# Patient Record
Sex: FEMALE | Race: WHITE | NOT HISPANIC OR LATINO | Employment: PART TIME | URBAN - METROPOLITAN AREA
[De-identification: names, ages, dates, MRNs, and addresses within clinical notes are randomized per-mention and may not be internally consistent; named-entity substitution may affect disease eponyms.]

---
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
## 2022-06-17 ENCOUNTER — APPOINTMENT (OUTPATIENT)
Dept: GENERAL RADIOLOGY | Facility: CLINIC | Age: 22
End: 2022-06-17
Attending: EMERGENCY MEDICINE
Payer: COMMERCIAL

## 2022-06-17 ENCOUNTER — APPOINTMENT (OUTPATIENT)
Dept: CT IMAGING | Facility: CLINIC | Age: 22
End: 2022-06-17
Attending: EMERGENCY MEDICINE
Payer: COMMERCIAL

## 2022-06-17 ENCOUNTER — HOSPITAL ENCOUNTER (EMERGENCY)
Facility: CLINIC | Age: 22
Discharge: HOME | End: 2022-06-17
Attending: EMERGENCY MEDICINE | Admitting: EMERGENCY MEDICINE
Payer: COMMERCIAL

## 2022-06-17 VITALS
OXYGEN SATURATION: 99 % | HEART RATE: 50 BPM | RESPIRATION RATE: 9 BRPM | SYSTOLIC BLOOD PRESSURE: 115 MMHG | DIASTOLIC BLOOD PRESSURE: 67 MMHG | WEIGHT: 160.8 LBS | TEMPERATURE: 97.8 F

## 2022-06-17 DIAGNOSIS — R25.1 SHAKING: ICD-10-CM

## 2022-06-17 DIAGNOSIS — R06.00 DYSPNEA, UNSPECIFIED TYPE: ICD-10-CM

## 2022-06-17 DIAGNOSIS — R53.1 GENERALIZED WEAKNESS: ICD-10-CM

## 2022-06-17 DIAGNOSIS — R11.0 NAUSEA: ICD-10-CM

## 2022-06-17 LAB
ALBUMIN SERPL-MCNC: 3.8 G/DL (ref 3.4–5)
ALBUMIN UR-MCNC: NEGATIVE MG/DL
ALP SERPL-CCNC: 67 U/L (ref 40–150)
ALT SERPL W P-5'-P-CCNC: 31 U/L (ref 0–50)
ANION GAP SERPL CALCULATED.3IONS-SCNC: 12 MMOL/L (ref 3–14)
APPEARANCE UR: CLEAR
AST SERPL W P-5'-P-CCNC: 19 U/L (ref 0–45)
BASOPHILS # BLD AUTO: 0 10E3/UL (ref 0–0.2)
BASOPHILS NFR BLD AUTO: 0 %
BILIRUB SERPL-MCNC: 1 MG/DL (ref 0.2–1.3)
BILIRUB UR QL STRIP: NEGATIVE
BUN SERPL-MCNC: 14 MG/DL (ref 7–30)
CALCIUM SERPL-MCNC: 9 MG/DL (ref 8.5–10.1)
CHLORIDE BLD-SCNC: 108 MMOL/L (ref 94–109)
CK SERPL-CCNC: 439 U/L (ref 30–225)
CO2 SERPL-SCNC: 18 MMOL/L (ref 20–32)
COLOR UR AUTO: ABNORMAL
CREAT SERPL-MCNC: 0.78 MG/DL (ref 0.52–1.04)
D DIMER PPP FEU-MCNC: 0.66 UG/ML FEU (ref 0–0.5)
EOSINOPHIL # BLD AUTO: 0 10E3/UL (ref 0–0.7)
EOSINOPHIL NFR BLD AUTO: 0 %
ERYTHROCYTE [DISTWIDTH] IN BLOOD BY AUTOMATED COUNT: 12.1 % (ref 10–15)
FLUAV RNA SPEC QL NAA+PROBE: NEGATIVE
FLUBV RNA RESP QL NAA+PROBE: NEGATIVE
GFR SERPL CREATININE-BSD FRML MDRD: >90 ML/MIN/1.73M2
GLUCOSE BLD-MCNC: 112 MG/DL (ref 70–99)
GLUCOSE BLDC GLUCOMTR-MCNC: 119 MG/DL (ref 70–99)
GLUCOSE UR STRIP-MCNC: NEGATIVE MG/DL
HCG SER QL IA.RAPID: NEGATIVE
HCT VFR BLD AUTO: 41.4 % (ref 35–47)
HGB BLD-MCNC: 14.5 G/DL (ref 11.7–15.7)
HGB UR QL STRIP: NEGATIVE
IMM GRANULOCYTES # BLD: 0.1 10E3/UL
IMM GRANULOCYTES NFR BLD: 0 %
KETONES UR STRIP-MCNC: NEGATIVE MG/DL
LEUKOCYTE ESTERASE UR QL STRIP: ABNORMAL
LYMPHOCYTES # BLD AUTO: 0.9 10E3/UL (ref 0.8–5.3)
LYMPHOCYTES NFR BLD AUTO: 7 %
MAGNESIUM SERPL-MCNC: 1.7 MG/DL (ref 1.6–2.3)
MCH RBC QN AUTO: 30 PG (ref 26.5–33)
MCHC RBC AUTO-ENTMCNC: 35 G/DL (ref 31.5–36.5)
MCV RBC AUTO: 86 FL (ref 78–100)
MONOCYTES # BLD AUTO: 0.5 10E3/UL (ref 0–1.3)
MONOCYTES NFR BLD AUTO: 4 %
NEUTROPHILS # BLD AUTO: 10.7 10E3/UL (ref 1.6–8.3)
NEUTROPHILS NFR BLD AUTO: 89 %
NITRATE UR QL: NEGATIVE
NRBC # BLD AUTO: 0 10E3/UL
NRBC BLD AUTO-RTO: 0 /100
NT-PROBNP SERPL-MCNC: 34 PG/ML (ref 0–450)
PH UR STRIP: 7.5 [PH] (ref 5–7)
PLATELET # BLD AUTO: 235 10E3/UL (ref 150–450)
POTASSIUM BLD-SCNC: 3.6 MMOL/L (ref 3.4–5.3)
PROT SERPL-MCNC: 7.4 G/DL (ref 6.8–8.8)
RBC # BLD AUTO: 4.83 10E6/UL (ref 3.8–5.2)
RBC URINE: 1 /HPF
RSV RNA SPEC NAA+PROBE: NEGATIVE
SARS-COV-2 RNA RESP QL NAA+PROBE: NEGATIVE
SODIUM SERPL-SCNC: 138 MMOL/L (ref 133–144)
SP GR UR STRIP: 1 (ref 1–1.03)
SQUAMOUS EPITHELIAL: 1 /HPF
TROPONIN I SERPL HS-MCNC: <3 NG/L
TSH SERPL DL<=0.005 MIU/L-ACNC: 0.96 MU/L (ref 0.4–4)
UROBILINOGEN UR STRIP-MCNC: NORMAL MG/DL
WBC # BLD AUTO: 12.1 10E3/UL (ref 4–11)
WBC URINE: 6 /HPF

## 2022-06-17 PROCEDURE — 250N000013 HC RX MED GY IP 250 OP 250 PS 637: Performed by: EMERGENCY MEDICINE

## 2022-06-17 PROCEDURE — 81001 URINALYSIS AUTO W/SCOPE: CPT | Performed by: EMERGENCY MEDICINE

## 2022-06-17 PROCEDURE — 36415 COLL VENOUS BLD VENIPUNCTURE: CPT | Performed by: EMERGENCY MEDICINE

## 2022-06-17 PROCEDURE — 82040 ASSAY OF SERUM ALBUMIN: CPT | Performed by: EMERGENCY MEDICINE

## 2022-06-17 PROCEDURE — 84484 ASSAY OF TROPONIN QUANT: CPT | Performed by: EMERGENCY MEDICINE

## 2022-06-17 PROCEDURE — 84443 ASSAY THYROID STIM HORMONE: CPT | Performed by: EMERGENCY MEDICINE

## 2022-06-17 PROCEDURE — 71045 X-RAY EXAM CHEST 1 VIEW: CPT

## 2022-06-17 PROCEDURE — 80053 COMPREHEN METABOLIC PANEL: CPT | Performed by: EMERGENCY MEDICINE

## 2022-06-17 PROCEDURE — 82550 ASSAY OF CK (CPK): CPT | Performed by: EMERGENCY MEDICINE

## 2022-06-17 PROCEDURE — 84702 CHORIONIC GONADOTROPIN TEST: CPT

## 2022-06-17 PROCEDURE — 71275 CT ANGIOGRAPHY CHEST: CPT

## 2022-06-17 PROCEDURE — 96361 HYDRATE IV INFUSION ADD-ON: CPT

## 2022-06-17 PROCEDURE — 96360 HYDRATION IV INFUSION INIT: CPT | Mod: 59

## 2022-06-17 PROCEDURE — 85025 COMPLETE CBC W/AUTO DIFF WBC: CPT | Performed by: EMERGENCY MEDICINE

## 2022-06-17 PROCEDURE — 258N000003 HC RX IP 258 OP 636: Performed by: EMERGENCY MEDICINE

## 2022-06-17 PROCEDURE — 87637 SARSCOV2&INF A&B&RSV AMP PRB: CPT | Performed by: EMERGENCY MEDICINE

## 2022-06-17 PROCEDURE — 83735 ASSAY OF MAGNESIUM: CPT | Performed by: EMERGENCY MEDICINE

## 2022-06-17 PROCEDURE — C9803 HOPD COVID-19 SPEC COLLECT: HCPCS

## 2022-06-17 PROCEDURE — 93005 ELECTROCARDIOGRAM TRACING: CPT

## 2022-06-17 PROCEDURE — 99285 EMERGENCY DEPT VISIT HI MDM: CPT | Mod: CS,25

## 2022-06-17 PROCEDURE — 250N000011 HC RX IP 250 OP 636: Performed by: EMERGENCY MEDICINE

## 2022-06-17 PROCEDURE — 83880 ASSAY OF NATRIURETIC PEPTIDE: CPT | Performed by: EMERGENCY MEDICINE

## 2022-06-17 PROCEDURE — 85379 FIBRIN DEGRADATION QUANT: CPT | Performed by: EMERGENCY MEDICINE

## 2022-06-17 RX ORDER — IOPAMIDOL 755 MG/ML
500 INJECTION, SOLUTION INTRAVASCULAR ONCE
Status: COMPLETED | OUTPATIENT
Start: 2022-06-17 | End: 2022-06-17

## 2022-06-17 RX ADMIN — IOPAMIDOL 50 ML: 755 INJECTION, SOLUTION INTRAVENOUS at 19:37

## 2022-06-17 RX ADMIN — FLUOXETINE 80 MG: 20 CAPSULE ORAL at 19:01

## 2022-06-17 RX ADMIN — SODIUM CHLORIDE 1000 ML: 9 INJECTION, SOLUTION INTRAVENOUS at 18:38

## 2022-06-17 ASSESSMENT — ENCOUNTER SYMPTOMS
COUGH: 0
HEADACHES: 0
CHEST TIGHTNESS: 1
TREMORS: 1
DIZZINESS: 1
PALPITATIONS: 0
VOMITING: 0
CHILLS: 1
FEVER: 0
SHORTNESS OF BREATH: 1
NAUSEA: 1
ABDOMINAL PAIN: 1
WEAKNESS: 1

## 2022-06-17 NOTE — ED TRIAGE NOTES
Patient states she woke from a nap this afternoon feeling SOB, dizzy, she had chills, and was shaking.  Patient reports she felt fine this morning, and that occasionally when she works out to hard she feels this way but she has not worked out today.  Patient has seen a cardiologist for similar symptoms but was told everything was alright.      Triage Assessment     Row Name 06/17/22 1800       Triage Assessment (Adult)    Airway WDL WDL       Respiratory WDL    Respiratory WDL WDL       Skin Circulation/Temperature WDL    Skin Circulation/Temperature WDL WDL       Cardiac WDL    Cardiac WDL WDL       Peripheral/Neurovascular WDL    Peripheral Neurovascular WDL WDL       Cognitive/Neuro/Behavioral WDL    Cognitive/Neuro/Behavioral WDL WDL

## 2022-06-17 NOTE — ED PROVIDER NOTES
"  History     Chief Complaint:  Nausea, dizziness, shaking, dyspnea     HPI   Lubna Valentine is a 22 year old female with a history of OCD who presents with concerns for onset of nausea, dizziness, shaking, dyspnea, generalized weakness upon awakening from a nap this morning. She notes associated chest tightness, a sensation of tight breathing. She denies leg swelling or pain. She notes tingling in all her extremities. She notes she has had similar symptoms in the past with exercise, has had cardiology evaluation by a cardiologist with an echo, with \"dysautonomia\" thought a possible cause of her symptoms. She reports helping move a friend yesterday without associated symptoms. She notes today was a normal day for her. She missed her fluoxetine dose of 80mg today but does not recall noting having missed doses in the past with similar symptoms, although has missed doses in the past.  She also notes 1 week ago she started clomipramine for ODD.  She denies using that regularly since she started it however.  She did not use it today.    ROS:  Review of Systems   Constitutional: Positive for chills. Negative for fever.   Respiratory: Positive for chest tightness and shortness of breath. Negative for cough.    Cardiovascular: Negative for chest pain, palpitations and leg swelling.   Gastrointestinal: Positive for abdominal pain and nausea. Negative for vomiting.   Genitourinary: Negative.    Skin: Negative.    Neurological: Positive for dizziness, tremors and weakness. Negative for headaches.        Positive for extremity tingling.   All other systems reviewed and are negative.      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 She does not have any smokeless tobacco history on file.  No recreational drug use.  Denies regular alcohol use.  Here with a friend.  PCP: Radha Pop     Physical Exam     Patient Vitals for the past 24 hrs:   BP Temp Temp src Pulse Resp SpO2 Weight   06/17/22 1800 (!) 115/91 97.8  F (36.6  C) Oral 107 18 97 % 72.9 kg (160 lb 12.8 oz)        Physical Exam  General: Adult female sitting upright  Eyes: PERRL, Conjunctive within normal limits. No scleral icterus.   ENT: Moist mucous membranes, oropharynx clear.   CV: Normal S1S2, no murmur, rub or gallop. Tachycardic, regular.  Resp: Clear to auscultation bilaterally, no wheezes, rales or rhonchi. Normal respiratory effort.  GI: Abdomen is soft, nontender and nondistended. No palpable masses. No rebound or guarding.  MSK: No edema. Nontender. Normal active range of motion. No rigidity at rest, however seems to develop mild rigidity/spasm of her lower extremities with sitting upright.   Skin: Warm and dry. No rashes or lesions or ecchymoses on visible skin.  Neuro: Alert and oriented. Responds appropriately to all questions and commands. No focal findings appreciated. Increased muscle tone to the lower extremities bilaterally with movement. No clonus. Mild full body tremulousness with movement.   Psych: Normal mood and affect.     Emergency Department Course   ECG:  A twelve-lead ECG was performed for chest tightness and dyspnea on June 17, 2022 at 1848  Sinus rhythm with frequent premature ventricular complexes.  Otherwise normal ECG.  Ventricular rate 87 bpm MD interval 146 ms QRS duration 70 ms QT/QTc 390/469  ms PRT axes 43 4 68  Preliminary read per Dr. Maribel Pruitt.      Imaging:  CT Chest Pulmonary Embolism w Contrast   Final Result   IMPRESSION:   1.  No pulmonary emboli. No acute findings in the chest.      XR Chest Port 1 View   Final Result   IMPRESSION: Negative chest.         Report per radiology    Laboratory:  Labs Ordered and Resulted from Time of ED Arrival to Time of ED Departure   GLUCOSE BY METER - Abnormal       Result Value    GLUCOSE BY METER POCT 119 (*)    COMPREHENSIVE METABOLIC PANEL - Abnormal    Sodium 138      Potassium 3.6      Chloride 108      Carbon Dioxide (CO2) 18 (*)     Anion Gap 12      Urea Nitrogen 14      Creatinine 0.78      Calcium 9.0      Glucose 112 (*)     Alkaline Phosphatase 67      AST 19      ALT 31      Protein Total 7.4      Albumin 3.8      Bilirubin Total 1.0      GFR Estimate >90     D DIMER QUANTITATIVE - Abnormal    D-Dimer Quantitative 0.66 (*)    CBC WITH PLATELETS AND DIFFERENTIAL - Abnormal    WBC Count 12.1 (*)     RBC Count 4.83      Hemoglobin 14.5      Hematocrit 41.4      MCV 86      MCH 30.0      MCHC 35.0      RDW 12.1      Platelet Count 235      % Neutrophils 89      % Lymphocytes 7      % Monocytes 4      % Eosinophils 0      % Basophils 0      % Immature Granulocytes 0      NRBCs per 100 WBC 0      Absolute Neutrophils 10.7 (*)     Absolute Lymphocytes 0.9      Absolute Monocytes 0.5      Absolute Eosinophils 0.0      Absolute Basophils 0.0      Absolute Immature Granulocytes 0.1      Absolute NRBCs 0.0     CK TOTAL - Abnormal     (*)    ROUTINE UA WITH MICROSCOPIC REFLEX TO CULTURE - Abnormal    Color Urine Straw      Appearance Urine Clear      Glucose Urine Negative      Bilirubin Urine Negative      Ketones Urine Negative      Specific Gravity Urine 1.004      Blood Urine Negative      pH Urine 7.5 (*)     Protein Albumin Urine Negative      Urobilinogen Urine Normal      Nitrite Urine Negative      Leukocyte Esterase Urine Trace (*)     RBC  Urine 1      WBC Urine 6 (*)     Squamous Epithelials Urine 1     TROPONIN I - Normal    Troponin I High Sensitivity <3     NT PROBNP INPATIENT - Normal    N terminal Pro BNP Inpatient 34     MAGNESIUM - Normal    Magnesium 1.7     TSH WITH FREE T4 REFLEX - Normal    TSH 0.96     INFLUENZA A/B & SARS-COV2 PCR MULTIPLEX - Normal    Influenza A PCR Negative      Influenza B PCR Negative      RSV PCR Negative      SARS CoV2 PCR Negative     ISTAT HCG QUALITATIVE PREGNANCY POCT - Normal    HCG Qualitative POCT Negative        Emergency Department Course:    Reviewed:  I reviewed nursing notes, vitals and past medical history    Assessments:   I obtained history and examined the patient as noted above.    I rechecked the patient and explained findings.  She notes she is feeling improved.  She denies any new concerns.      Interventions:  Medications   0.9% sodium chloride BOLUS (0 mLs Intravenous Stopped 6/17/22 2017)   FLUoxetine (PROzac) capsule 80 mg (80 mg Oral Given 6/17/22 1901)   sodium chloride (PF) 0.9% PF flush 100 mL (80 mLs Intravenous Given 6/17/22 1937)   iopamidol (ISOVUE-370) solution 500 mL (50 mLs Intravenous Given 6/17/22 1937)        Disposition:  The patient was discharged to home.     Impression & Plan        Medical Decision Making:  Lubna Sexton is a 22-year-old female on 80 mg daily of Prozac for OCD who presents emergency department with multiple concerns, nonspecific in nature, with no focal findings on examination.  She did have mild tremulousness and rigidity noted in lower extremities with movement.  No clonus.  No focal deficit.  He had missed her 80 mg Prozac this morning.  It is possible her symptoms could be explained by withdrawal from Prozac.  Her symptoms did resolve over time here in the emergency department with administration of her baseline dose of Prozac.  IV fluids were also administered.  Laboratory assessment is consistent with mild elevation of CK but no cory  rhabdomyolysis.  She had come in complaining as well of shortness of breath and chest tightness and was tachycardic with a slightly elevated D-dimer.  She is not hypoxic.  Fortunately CT chest did not show evidence of pulmonary embolism as a cause for her symptoms.  Metabolic abnormality, infection, other drug ingestion, etc. were also considered but seem less likely.  ACS, myocarditis or pericarditis considered but troponin was normal with no concerning ECG findings.  Given her overall well appearance on reassessment I feel comfortable discharge home at home.  She is strongly recommended to daily take her dose of Prozac in the case that this is the cause of her symptoms.  If they should recur and are severe she should return immediate to the emergency department.  Should be reassessed with her primary care provider early next week within the next 3 days.  She felt comfortable this plan.  All questions were answered prior to discharge      Diagnosis:    ICD-10-CM    1. Dyspnea, unspecified type  R06.00    2. Nausea  R11.0    3. Generalized weakness  R53.1    4. Shaking  R25.1            6/17/2022   Maribel Pruitt MD Jonkman, Tracy Dianne, MD  06/18/22 2039

## 2022-06-18 LAB
ATRIAL RATE - MUSE: 87 BPM
DIASTOLIC BLOOD PRESSURE - MUSE: NORMAL MMHG
INTERPRETATION ECG - MUSE: NORMAL
P AXIS - MUSE: 43 DEGREES
PR INTERVAL - MUSE: 146 MS
QRS DURATION - MUSE: 70 MS
QT - MUSE: 390 MS
QTC - MUSE: 469 MS
R AXIS - MUSE: 4 DEGREES
SYSTOLIC BLOOD PRESSURE - MUSE: NORMAL MMHG
T AXIS - MUSE: 68 DEGREES
VENTRICULAR RATE- MUSE: 87 BPM

## 2022-06-18 NOTE — DISCHARGE INSTRUCTIONS
It is possible that the symptoms are due to missing your dose of fluoxetine.  Try to avoid missing any doses.  Discuss this further with your primary care provider if needed.